# Patient Record
Sex: MALE | Race: BLACK OR AFRICAN AMERICAN | Employment: FULL TIME | URBAN - METROPOLITAN AREA
[De-identification: names, ages, dates, MRNs, and addresses within clinical notes are randomized per-mention and may not be internally consistent; named-entity substitution may affect disease eponyms.]

---

## 2018-01-31 ENCOUNTER — HOSPITAL ENCOUNTER (EMERGENCY)
Age: 53
Discharge: HOME | End: 2018-01-31
Attending: EMERGENCY MEDICINE
Payer: SELF-PAY

## 2018-01-31 VITALS
TEMPERATURE: 100.4 F | SYSTOLIC BLOOD PRESSURE: 144 MMHG | OXYGEN SATURATION: 97 % | RESPIRATION RATE: 18 BRPM | WEIGHT: 220 LBS | HEART RATE: 84 BPM | HEIGHT: 76 IN | DIASTOLIC BLOOD PRESSURE: 88 MMHG | BODY MASS INDEX: 26.79 KG/M2

## 2018-01-31 DIAGNOSIS — J10.1 INFLUENZA B: Primary | ICD-10-CM

## 2018-01-31 LAB
FLUAV AG NPH QL IA: NEGATIVE
FLUBV AG NPH QL IA: POSITIVE

## 2018-01-31 PROCEDURE — 87804 INFLUENZA ASSAY W/OPTIC: CPT | Performed by: EMERGENCY MEDICINE

## 2018-01-31 PROCEDURE — 99283 EMERGENCY DEPT VISIT LOW MDM: CPT | Performed by: NURSE PRACTITIONER

## 2018-01-31 PROCEDURE — 74011250637 HC RX REV CODE- 250/637: Performed by: NURSE PRACTITIONER

## 2018-01-31 RX ORDER — BENZONATATE 100 MG/1
100 CAPSULE ORAL
Qty: 30 CAP | Refills: 0 | Status: SHIPPED | OUTPATIENT
Start: 2018-01-31 | End: 2018-02-07

## 2018-01-31 RX ORDER — HYDROGEN PEROXIDE 3 %
20 SOLUTION, NON-ORAL MISCELLANEOUS DAILY
COMMUNITY

## 2018-01-31 RX ORDER — BENZONATATE 100 MG/1
100 CAPSULE ORAL
Status: COMPLETED | OUTPATIENT
Start: 2018-01-31 | End: 2018-01-31

## 2018-01-31 RX ORDER — ACETAMINOPHEN 325 MG/1
650 TABLET ORAL
Status: COMPLETED | OUTPATIENT
Start: 2018-01-31 | End: 2018-01-31

## 2018-01-31 RX ADMIN — ACETAMINOPHEN 650 MG: 325 TABLET ORAL at 16:38

## 2018-01-31 RX ADMIN — BENZONATATE 100 MG: 100 CAPSULE ORAL at 16:38

## 2018-01-31 NOTE — DISCHARGE INSTRUCTIONS

## 2018-01-31 NOTE — ED NOTES
I have reviewed discharge instructions with the patient. The patient verbalized understanding. Patient left ED via Discharge Method: ambulatory to Home with wife). Opportunity for questions and clarification provided. Patient given 1 scripts. To continue your aftercare when you leave the hospital, you may receive an automated call from our care team to check in on how you are doing. This is a free service and part of our promise to provide the best care and service to meet your aftercare needs.  If you have questions, or wish to unsubscribe from this service please call [phone]. Thank you for Choosing our [facility] Emergency Department.

## 2018-01-31 NOTE — ED PROVIDER NOTES
HPI Comments: Patient presents with cough, fever, and generalized body aches for the past 3 days. Patient is a 47 y.o. male presenting with fever. The history is provided by the patient. Fever    This is a new problem. The current episode started more than 2 days ago. The problem occurs constantly. The problem has not changed since onset. The maximum temperature noted was 102 - 102.9 F. The temperature was taken using an oral thermometer. Associated symptoms include congestion, muscle aches and cough. Pertinent negatives include no vomiting. Past Medical History:   Diagnosis Date    Gastrointestinal disorder        Past Surgical History:   Procedure Laterality Date    HX GI           History reviewed. No pertinent family history. Social History     Social History    Marital status:      Spouse name: N/A    Number of children: N/A    Years of education: N/A     Occupational History    Not on file. Social History Main Topics    Smoking status: Never Smoker    Smokeless tobacco: Never Used    Alcohol use Yes    Drug use: No    Sexual activity: Not on file     Other Topics Concern    Not on file     Social History Narrative         ALLERGIES: Review of patient's allergies indicates no known allergies. Review of Systems   Constitutional: Positive for chills and fever. HENT: Positive for congestion. Respiratory: Positive for cough. Gastrointestinal: Negative for nausea and vomiting. Musculoskeletal: Positive for myalgias. Vitals:    01/31/18 1549   BP: (!) 148/96   Pulse: (!) 106   Resp: 16   Temp: (!) 102.1 °F (38.9 °C)   SpO2: 98%   Weight: 99.8 kg (220 lb)   Height: 6' 4\" (1.93 m)            Physical Exam   Constitutional: He is oriented to person, place, and time. He appears well-developed and well-nourished. No distress. HENT:   Right Ear: Tympanic membrane is erythematous. A middle ear effusion is present. Left Ear: Tympanic membrane is erythematous.  A middle ear effusion is present. Nose: Mucosal edema present. Cardiovascular: Regular rhythm. Tachycardia present. Pulmonary/Chest: Effort normal and breath sounds normal. No respiratory distress. Neurological: He is alert and oriented to person, place, and time. Skin: Skin is warm and dry. He is not diaphoretic. Psychiatric: He has a normal mood and affect. His behavior is normal.   Nursing note and vitals reviewed. Recent Results (from the past 12 hour(s))   INFLUENZA A & B AG (RAPID TEST)    Collection Time: 01/31/18  3:51 PM   Result Value Ref Range    Influenza A Ag NEGATIVE  NEG      Influenza B Ag POSITIVE (A) NEG         MDM  Number of Diagnoses or Management Options  Influenza B:   Diagnosis management comments: Patient with positive flu test. Patient given po tessalon and tylenol prior to discharge. Patient given prescription for tessalon. Patient is outside the time frame for tamiflu.         Amount and/or Complexity of Data Reviewed  Clinical lab tests: ordered and reviewed  Tests in the medicine section of CPT®: ordered    Patient Progress  Patient progress: stable        ED Course       Procedures

## 2021-12-08 ENCOUNTER — HOSPITAL ENCOUNTER (EMERGENCY)
Age: 56
Discharge: HOME | End: 2021-12-09
Attending: EMERGENCY MEDICINE
Payer: OTHER GOVERNMENT

## 2021-12-08 DIAGNOSIS — R19.7 NAUSEA VOMITING AND DIARRHEA: ICD-10-CM

## 2021-12-08 DIAGNOSIS — R11.2 NAUSEA VOMITING AND DIARRHEA: ICD-10-CM

## 2021-12-08 DIAGNOSIS — U07.1 COVID-19: Primary | ICD-10-CM

## 2021-12-08 LAB
ALBUMIN SERPL-MCNC: 3.5 G/DL (ref 3.5–5)
ALBUMIN/GLOB SERPL: 0.7 {RATIO} (ref 1.2–3.5)
ALP SERPL-CCNC: 91 U/L (ref 50–136)
ALT SERPL-CCNC: 67 U/L (ref 12–65)
ANION GAP SERPL CALC-SCNC: 8 MMOL/L (ref 7–16)
AST SERPL-CCNC: 61 U/L (ref 15–37)
BASOPHILS # BLD: 0 K/UL (ref 0–0.2)
BASOPHILS NFR BLD: 0 % (ref 0–2)
BILIRUB SERPL-MCNC: 1.1 MG/DL (ref 0.2–1.1)
BUN SERPL-MCNC: 17 MG/DL (ref 6–23)
CALCIUM SERPL-MCNC: 9.4 MG/DL (ref 8.3–10.4)
CHLORIDE SERPL-SCNC: 101 MMOL/L (ref 98–107)
CO2 SERPL-SCNC: 24 MMOL/L (ref 21–32)
CREAT SERPL-MCNC: 1.59 MG/DL (ref 0.8–1.5)
DIFFERENTIAL METHOD BLD: ABNORMAL
EOSINOPHIL # BLD: 0 K/UL (ref 0–0.8)
EOSINOPHIL NFR BLD: 0 % (ref 0.5–7.8)
ERYTHROCYTE [DISTWIDTH] IN BLOOD BY AUTOMATED COUNT: 13.9 % (ref 11.9–14.6)
GLOBULIN SER CALC-MCNC: 5.2 G/DL (ref 2.3–3.5)
GLUCOSE SERPL-MCNC: 105 MG/DL (ref 65–100)
HCT VFR BLD AUTO: 50.2 % (ref 41.1–50.3)
HGB BLD-MCNC: 16.7 G/DL (ref 13.6–17.2)
IMM GRANULOCYTES # BLD AUTO: 0 K/UL (ref 0–0.5)
IMM GRANULOCYTES NFR BLD AUTO: 0 % (ref 0–5)
LIPASE SERPL-CCNC: 367 U/L (ref 73–393)
LYMPHOCYTES # BLD: 1.3 K/UL (ref 0.5–4.6)
LYMPHOCYTES NFR BLD: 24 % (ref 13–44)
MCH RBC QN AUTO: 29.6 PG (ref 26.1–32.9)
MCHC RBC AUTO-ENTMCNC: 33.3 G/DL (ref 31.4–35)
MCV RBC AUTO: 89 FL (ref 79.6–97.8)
MONOCYTES # BLD: 0.3 K/UL (ref 0.1–1.3)
MONOCYTES NFR BLD: 6 % (ref 4–12)
NEUTS SEG # BLD: 4 K/UL (ref 1.7–8.2)
NEUTS SEG NFR BLD: 70 % (ref 43–78)
NRBC # BLD: 0 K/UL (ref 0–0.2)
PLATELET # BLD AUTO: 216 K/UL (ref 150–450)
PMV BLD AUTO: 10.9 FL (ref 9.4–12.3)
POTASSIUM SERPL-SCNC: 4.3 MMOL/L (ref 3.5–5.1)
PROT SERPL-MCNC: 8.7 G/DL (ref 6.3–8.2)
RBC # BLD AUTO: 5.64 M/UL (ref 4.23–5.6)
SODIUM SERPL-SCNC: 133 MMOL/L (ref 136–145)
WBC # BLD AUTO: 5.6 K/UL (ref 4.3–11.1)

## 2021-12-08 PROCEDURE — 85025 COMPLETE CBC W/AUTO DIFF WBC: CPT

## 2021-12-08 PROCEDURE — 74011250637 HC RX REV CODE- 250/637: Performed by: PHYSICIAN ASSISTANT

## 2021-12-08 PROCEDURE — 96375 TX/PRO/DX INJ NEW DRUG ADDON: CPT

## 2021-12-08 PROCEDURE — 96361 HYDRATE IV INFUSION ADD-ON: CPT

## 2021-12-08 PROCEDURE — 99283 EMERGENCY DEPT VISIT LOW MDM: CPT

## 2021-12-08 PROCEDURE — 80053 COMPREHEN METABOLIC PANEL: CPT

## 2021-12-08 PROCEDURE — 74011000250 HC RX REV CODE- 250: Performed by: PHYSICIAN ASSISTANT

## 2021-12-08 PROCEDURE — 96374 THER/PROPH/DIAG INJ IV PUSH: CPT

## 2021-12-08 PROCEDURE — 83690 ASSAY OF LIPASE: CPT

## 2021-12-08 PROCEDURE — 74011250636 HC RX REV CODE- 250/636: Performed by: PHYSICIAN ASSISTANT

## 2021-12-08 RX ORDER — SODIUM CHLORIDE 0.9 % (FLUSH) 0.9 %
5-10 SYRINGE (ML) INJECTION EVERY 8 HOURS
Status: DISCONTINUED | OUTPATIENT
Start: 2021-12-08 | End: 2021-12-09 | Stop reason: HOSPADM

## 2021-12-08 RX ORDER — SODIUM CHLORIDE 0.9 % (FLUSH) 0.9 %
5-10 SYRINGE (ML) INJECTION AS NEEDED
Status: DISCONTINUED | OUTPATIENT
Start: 2021-12-08 | End: 2021-12-09 | Stop reason: HOSPADM

## 2021-12-08 RX ORDER — HYOSCYAMINE SULFATE 0.12 MG/1
0.12 TABLET SUBLINGUAL
Qty: 12 TABLET | Refills: 0 | Status: SHIPPED | OUTPATIENT
Start: 2021-12-08 | End: 2021-12-11

## 2021-12-08 RX ORDER — HYOSCYAMINE SULFATE 0.12 MG/1
0.12 TABLET SUBLINGUAL
Status: COMPLETED | OUTPATIENT
Start: 2021-12-08 | End: 2021-12-08

## 2021-12-08 RX ORDER — ONDANSETRON 2 MG/ML
4 INJECTION INTRAMUSCULAR; INTRAVENOUS
Status: COMPLETED | OUTPATIENT
Start: 2021-12-08 | End: 2021-12-08

## 2021-12-08 RX ORDER — ONDANSETRON 4 MG/1
4 TABLET, ORALLY DISINTEGRATING ORAL
Qty: 12 TABLET | Refills: 0 | Status: SHIPPED | OUTPATIENT
Start: 2021-12-08

## 2021-12-08 RX ADMIN — HYOSCYAMINE SULFATE 0.12 MG: 0.12 TABLET ORAL; SUBLINGUAL at 22:05

## 2021-12-08 RX ADMIN — SODIUM CHLORIDE 1000 ML: 900 INJECTION, SOLUTION INTRAVENOUS at 22:04

## 2021-12-08 RX ADMIN — ONDANSETRON 4 MG: 2 INJECTION INTRAMUSCULAR; INTRAVENOUS at 22:07

## 2021-12-08 RX ADMIN — Medication 5 ML: at 22:11

## 2021-12-08 RX ADMIN — FAMOTIDINE 20 MG: 10 INJECTION INTRAVENOUS at 22:10

## 2021-12-09 VITALS
HEART RATE: 106 BPM | TEMPERATURE: 99.7 F | WEIGHT: 240 LBS | BODY MASS INDEX: 29.84 KG/M2 | DIASTOLIC BLOOD PRESSURE: 90 MMHG | OXYGEN SATURATION: 96 % | SYSTOLIC BLOOD PRESSURE: 139 MMHG | RESPIRATION RATE: 22 BRPM | HEIGHT: 75 IN

## 2021-12-09 NOTE — ED PROVIDER NOTES
Patient is a 41-year-old male who presents with complaint of nausea vomiting diarrhea and generalized abdominal pain. He has known COVID-19 infection and tested +7 days ago. Symptoms onset 8 days prior. He is not vaccinated. He states he has had persistent nausea vomiting and diarrhea over the last 3 days and has not been able to keep anything down. Additionally he is having generalized abdominal pain states that it just feels \"sore all over\". He denies any fever, chills, chest pain, shortness of breath or difficulty breathing. He does not take any medications daily. The history is provided by the patient. Abdominal Pain  Associated symptoms include abdominal pain. Pertinent negatives include no chest pain and no shortness of breath. Positive For Covid-19  Associated symptoms include abdominal pain. Pertinent negatives include no chest pain and no shortness of breath. Past Medical History:   Diagnosis Date    Gastrointestinal disorder        Past Surgical History:   Procedure Laterality Date    HX GI           No family history on file. Social History     Socioeconomic History    Marital status:      Spouse name: Not on file    Number of children: Not on file    Years of education: Not on file    Highest education level: Not on file   Occupational History    Not on file   Tobacco Use    Smoking status: Never Smoker    Smokeless tobacco: Never Used   Substance and Sexual Activity    Alcohol use: Yes    Drug use: No    Sexual activity: Not on file   Other Topics Concern    Not on file   Social History Narrative    Not on file     Social Determinants of Health     Financial Resource Strain:     Difficulty of Paying Living Expenses: Not on file   Food Insecurity:     Worried About Running Out of Food in the Last Year: Not on file    Eri of Food in the Last Year: Not on file   Transportation Needs:     Lack of Transportation (Medical):  Not on file    Lack of Transportation (Non-Medical): Not on file   Physical Activity:     Days of Exercise per Week: Not on file    Minutes of Exercise per Session: Not on file   Stress:     Feeling of Stress : Not on file   Social Connections:     Frequency of Communication with Friends and Family: Not on file    Frequency of Social Gatherings with Friends and Family: Not on file    Attends [employer] Services: Not on file    Active Member of [address] [place] [address] or Organizations: Not on file    Attends Club or Organization Meetings: Not on file    Marital Status: Not on file   Intimate Partner Violence:     Fear of Current or Ex-Partner: Not on file    Emotionally Abused: Not on file    Physically Abused: Not on file    Sexually Abused: Not on file   Housing Stability:     Unable to Pay for Housing in the Last Year: Not on file    Number of [place] in the Last Year: Not on file    Unstable Housing in the Last Year: Not on file         ALLERGIES: Patient has no known allergies. Review of Systems   Constitutional: Negative for chills and fever. HENT: Negative for sore throat. Respiratory: Negative for cough and shortness of breath. Cardiovascular: Negative for chest pain. Gastrointestinal: Positive for abdominal pain, diarrhea, nausea and vomiting. Musculoskeletal: Negative for back pain. Neurological: Negative for dizziness, weakness and numbness. Psychiatric/Behavioral: Negative for agitation and confusion. Vitals:    12/08/21 1722   BP: (!) 146/98   Pulse: (!) 106   Resp: 22   Temp: 99.7 °F (37.6 °C)   SpO2: 97%   Weight: 108.9 kg (240 lb)   Height: 6' 3\" (1.905 m)            Physical Exam  Vitals and nursing note reviewed. Constitutional:       General: He is not in acute distress. Appearance: He is not ill-appearing or toxic-appearing. HENT:      Head: Normocephalic and atraumatic. Cardiovascular:      Rate and Rhythm: Normal rate and regular rhythm. Heart sounds: Normal heart sounds. Pulmonary:      Effort: Pulmonary effort is normal.      Breath sounds: Normal breath sounds. Abdominal:      General: Abdomen is flat. There is no distension. Palpations: Abdomen is soft. Tenderness: There is generalized abdominal tenderness. There is no guarding or rebound. Skin:     General: Skin is warm and dry. Neurological:      Mental Status: He is alert and oriented to person, place, and time. Psychiatric:         Mood and Affect: Mood normal.         Behavior: Behavior normal.          MDM  Number of Diagnoses or Management Options  COVID-19  Nausea vomiting and diarrhea  Diagnosis management comments: Patient is a 52-year-old male with a known COVID-19 infection on day 8 of symptoms presenting with nausea vomiting diarrhea. He is afebrile, nontoxic in appearance, generalized pain with palpation of abdomen however no rebound or guarding. Labs show mild ANGELICA, will give 1 L fluid bolus, Zofran, Pepcid and Levsin.     Labs Reviewed  CBC WITH AUTOMATED DIFF - Abnormal; Notable for the following components:     RBC                           5.64 (*)               EOSINOPHILS                   0 (*)               All other components within normal limits  METABOLIC PANEL, COMPREHENSIVE - Abnormal; Notable for the following components:     Sodium                        133 (*)                Glucose                       105 (*)                Creatinine                    1.59 (*)               GFR est AA                    58 (*)                 GFR est non-AA                48 (*)                 ALT (SGPT)                    67 (*)                 AST (SGOT)                    61 (*)                 Protein, total                8.7 (*)                Globulin                      5.2 (*)                A-G Ratio                     0.7 (*)             All other components within normal limits  LIPASE    Upon reevaluation patient resting comfortably in bed stating that his abdominal pain has resolved and he is feeling improved. Will DC with prescriptions for Zofran as well as Levsin. Advised patient to rest, increase fluid intake and advance his diet slowly as tolerated. Patient to continue to self isolate for 10 days following symptoms onset. Discussed reasons return to the ER such as worsening fevers, shortness of breath or difficulty breathing. Patient verbalized understanding is agreeable to plan.          Procedures

## 2021-12-09 NOTE — ED NOTES
I have reviewed discharge instructions with the patient. The patient verbalized understanding. Patient left ED via Discharge Method: ambulatory to Home with friend. Opportunity for questions and clarification provided. Patient given 2 scripts. To continue your aftercare when you leave the hospital, you may receive an automated call from our care team to check in on how you are doing. This is a free service and part of our promise to provide the best care and service to meet your aftercare needs.  If you have questions, or wish to unsubscribe from this service please call [phone]. Thank you for Choosing our [facility] Emergency Department.

## 2023-02-12 ENCOUNTER — APPOINTMENT (OUTPATIENT)
Dept: GENERAL RADIOLOGY | Age: 58
End: 2023-02-12

## 2023-02-12 ENCOUNTER — HOSPITAL ENCOUNTER (EMERGENCY)
Age: 58
Discharge: HOME | End: 2023-02-13
Attending: EMERGENCY MEDICINE

## 2023-02-12 VITALS
SYSTOLIC BLOOD PRESSURE: 152 MMHG | OXYGEN SATURATION: 99 % | RESPIRATION RATE: 18 BRPM | BODY MASS INDEX: 29.22 KG/M2 | DIASTOLIC BLOOD PRESSURE: 98 MMHG | HEART RATE: 77 BPM | HEIGHT: 76 IN | TEMPERATURE: 98.1 F | WEIGHT: 240 LBS

## 2023-02-12 DIAGNOSIS — S61.412A LACERATION OF LEFT HAND WITHOUT FOREIGN BODY, INITIAL ENCOUNTER: Primary | ICD-10-CM

## 2023-02-12 PROCEDURE — 90715 TDAP VACCINE 7 YRS/> IM: CPT

## 2023-02-12 PROCEDURE — 6360000002 HC RX W HCPCS

## 2023-02-12 PROCEDURE — 99284 EMERGENCY DEPT VISIT MOD MDM: CPT

## 2023-02-12 PROCEDURE — 90471 IMMUNIZATION ADMIN: CPT

## 2023-02-12 PROCEDURE — 73140 X-RAY EXAM OF FINGER(S): CPT

## 2023-02-12 PROCEDURE — 6370000000 HC RX 637 (ALT 250 FOR IP)

## 2023-02-12 RX ORDER — HYDROCODONE BITARTRATE AND ACETAMINOPHEN 5; 325 MG/1; MG/1
1 TABLET ORAL
Status: COMPLETED | OUTPATIENT
Start: 2023-02-12 | End: 2023-02-12

## 2023-02-12 RX ORDER — LIDOCAINE HYDROCHLORIDE 10 MG/ML
INJECTION, SOLUTION INFILTRATION; PERINEURAL
Status: COMPLETED
Start: 2023-02-12 | End: 2023-02-13

## 2023-02-12 RX ADMIN — TETANUS TOXOID, REDUCED DIPHTHERIA TOXOID AND ACELLULAR PERTUSSIS VACCINE, ADSORBED 0.5 ML: 5; 2.5; 8; 8; 2.5 SUSPENSION INTRAMUSCULAR at 22:07

## 2023-02-12 RX ADMIN — HYDROCODONE BITARTRATE AND ACETAMINOPHEN 1 TABLET: 5; 325 TABLET ORAL at 22:06

## 2023-02-12 ASSESSMENT — PAIN - FUNCTIONAL ASSESSMENT: PAIN_FUNCTIONAL_ASSESSMENT: 0-10

## 2023-02-12 ASSESSMENT — PAIN SCALES - GENERAL: PAINLEVEL_OUTOF10: 6

## 2023-02-12 ASSESSMENT — PAIN DESCRIPTION - LOCATION: LOCATION: FINGER (COMMENT WHICH ONE)

## 2023-02-12 ASSESSMENT — PAIN DESCRIPTION - ORIENTATION: ORIENTATION: LEFT

## 2023-02-12 NOTE — Clinical Note
Jonn Wilder was seen and treated in our emergency department on 2/12/2023. He may return to work on 02/14/2023. If you have any questions or concerns, please don't hesitate to call.       Bryon Eli, KRISTEN - CNP

## 2023-02-13 PROCEDURE — 2500000003 HC RX 250 WO HCPCS

## 2023-02-13 RX ADMIN — LIDOCAINE HYDROCHLORIDE: 10 INJECTION, SOLUTION INFILTRATION; PERINEURAL at 00:25

## 2023-02-13 NOTE — ED TRIAGE NOTES
Pt presents with lac to left tip of thumb, injury occurred about an hour ago, using mandolin blade at home, not UTD on tetanus, bandaged and bleeding controlled in triage.

## 2023-02-13 NOTE — ED PROVIDER NOTES
Emergency Department Provider Note                   PCP:                None None               Age: 65 y.o. Sex: male       ICD-10-CM    1. Laceration of left hand without foreign body, initial encounter  Adriana Cortés MD, [facility], [facility] Micah Kumar To Discharge 02/13/2023 12:10:24 AM        Medical Decision Making  65year old male presents for avulsion to tip left thumb after incident with mandolin. XR shows no associated fracture. Tetanus updated. Attempted bleeding control after digital block, unable to stop bleeding with direct pressure. Marcine Shock applied to the wound. Patient's employment relies directly on use of hands, provided follow up with ortho hand. Problems Addressed:  Laceration of left hand without foreign body, initial encounter: acute illness or injury    Amount and/or Complexity of Data Reviewed  Radiology: ordered. Risk  Prescription drug management. Complexity of Problem: 1 acute, uncomplicated illness or injury. (3)    I have conducted an independent ordering and review of X-rays. Patient was discharged risks and benefits of hospitalization were discussed. ED Course as of 02/13/23 0037   Sun Feb 12, 2023 2215 XR finger:  IMPRESSION:     1. No acute fracture. [CJ]   2305 Digital block performed, unable to control bleeding with direct pressure to the wound. Combat gauze placed to the wound.  [CJ]      ED Course User Index  [CJ] Nehemiah See, APRN - CNP        Orders Placed This Encounter   Procedures    XR FINGER LEFT (MIN 2 VIEWS)    [address] [facility] [address] Mariana Thibodeaux MD, [facility]         Medications   HYDROcodone-acetaminophen [facility]) 5-325 MG per tablet 1 tablet (1 tablet Oral Given 2/12/23 2206)   Tetanus-Diphth-Acell Pertussis (BOOSTRIX) injection 0.5 mL (0.5 mLs IntraMUSCular Given 2/12/23 2207)   lidocaine 1 % injection (  Given 2/13/23 0025)       Discharge Medication List as of 2/13/2023 12:20 AM           Ericka Charles is a 65 y.o. male who presents to the Emergency Department with chief complaint of  No chief complaint on file. 67-year-old male with no medical history presents with open laceration to left thumb. He states that he was attempting to use a mandolin to cut numbers. This occurred approximate 1 hour to arrival in the emergency department. He is unsure of his last tetanus. 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 The history is provided by the patient. Review of Systems   Musculoskeletal:  Negative for joint swelling. Skin:  Positive for wound. All other systems reviewed and are negative. Past Medical History:   Diagnosis Date    Gastrointestinal disorder         Past Surgical History:   Procedure Laterality Date    GI          History reviewed. No pertinent family history. Social History     Socioeconomic History    Marital status:      Spouse name: None    Number of children: None    Years of education: None    Highest education level: None   Tobacco Use    Smoking status: Never    Smokeless tobacco: Never   Substance and Sexual Activity    Alcohol use: Yes     Comment: occasionally    Drug use: No         Patient has no known allergies. Discharge Medication List as of 2/13/2023 12:20 AM        CONTINUE these medications which have NOT CHANGED    Details   esomeprazole (NEXIUM) 20 MG delayed release capsule Take 20 mg by mouth dailyHistorical Med      ondansetron (ZOFRAN-ODT) 4 MG disintegrating tablet Place 4 mg under the tongue every 8 hours as neededHistorical Med              Vitals signs and nursing note reviewed. Patient Vitals for the past 4 hrs:   Temp Pulse Resp BP SpO2   02/12/23 2122 98.1 °F (36.7 °C) 77 18 (!) 152/98 99 %          Physical Exam  Vitals and nursing note reviewed. Constitutional:       General: He is not in acute distress. Appearance: Normal appearance. He is normal weight. He is not ill-appearing, toxic-appearing or diaphoretic. HENT:      Head: Normocephalic and atraumatic. Right Ear: External ear normal.      Left Ear: External ear normal.      Nose: Nose normal.   Eyes:      Extraocular Movements: Extraocular movements intact. Pupils: Pupils are equal, round, and reactive to light. Cardiovascular:      Rate and Rhythm: Normal rate and regular rhythm. Pulses: Normal pulses. Pulmonary:      Effort: Pulmonary effort is normal.   Musculoskeletal:         General: Signs of injury present. No swelling, tenderness or deformity. Normal range of motion. Hands:       Cervical back: Normal range of motion. Comments: Avulsion present to left thumb, small amount of nail avulsed. ROM intact. Active bleeding present to wound. Neurological:      Mental Status: He is alert. Procedures    Results for orders placed or performed during the hospital encounter of 02/12/23   XR FINGER LEFT (MIN 2 VIEWS)    Narrative    EXAMINATION: XR FINGER LEFT (MIN 2 VIEWS)      DATE OF EXAM: 2/12/2023 9:36 PM    HISTORY: Open laceration left thumb     COMPARISON: None. FINDINGS:     No acute fracture or focal destruction. Normal alignment. Joint spaces   maintained. Thumb soft tissue swelling. Impression    1. No acute fracture. Thank you for the referral of this patient. This exam was interpreted by an   American Board of Radiology certified radiologist with subspecialty fellowship   training in body imaging. If there are any questions regarding this exam please   feel free to contact a radiologist directly at [phone]. Slot: 80     Jenifer Romero M.D.   2/12/2023 10:01:00 PM        XR FINGER LEFT (MIN 2 VIEWS)   Final Result      1. No acute fracture. Thank you for the referral of this patient.  This exam was interpreted by an    American Board of Radiology certified radiologist with subspecialty fellowship    training in body imaging. If there are any questions regarding this exam please    feel free to contact a radiologist directly at [phone]. Slot: 80       Ami Umana M.D.    2/12/2023 10:01:00 PM                          Voice dictation software was used during the making of this note. This software is not perfect and grammatical and other typographical errors may be present. This note has not been completely proofread for errors.      KRISTEN Corea - DELIA  02/13/23 0037

## 2023-02-13 NOTE — ED NOTES
I have reviewed discharge instructions with the patient. The patient verbalized understanding. Patient left ED via Discharge Method: ambulatory to Home with wife. Opportunity for questions and clarification provided. Patient given 0 scripts. To continue your aftercare when you leave the hospital, you may receive an automated call from our care team to check in on how you are doing. This is a free service and part of our promise to provide the best care and service to meet your aftercare needs.  If you have questions, or wish to unsubscribe from this service please call [phone]. Thank you for Choosing our [facility] Emergency Department.         Ree Rock RN  02/13/23 4319

## 2023-02-13 NOTE — DISCHARGE INSTRUCTIONS
Please leave dressing in place for 24 hours. Follow-up with hand. Please return for any redness, warmth, drainage from the wound.

## 2023-02-14 ENCOUNTER — HOSPITAL ENCOUNTER (EMERGENCY)
Age: 58
Discharge: HOME | End: 2023-02-14
Attending: EMERGENCY MEDICINE

## 2023-02-14 VITALS
HEIGHT: 76 IN | WEIGHT: 240 LBS | BODY MASS INDEX: 29.22 KG/M2 | HEART RATE: 77 BPM | TEMPERATURE: 98.2 F | RESPIRATION RATE: 16 BRPM | OXYGEN SATURATION: 97 % | DIASTOLIC BLOOD PRESSURE: 109 MMHG | SYSTOLIC BLOOD PRESSURE: 141 MMHG

## 2023-02-14 DIAGNOSIS — Z51.89 VISIT FOR WOUND CHECK: Primary | ICD-10-CM

## 2023-02-14 ASSESSMENT — LIFESTYLE VARIABLES
HOW MANY STANDARD DRINKS CONTAINING ALCOHOL DO YOU HAVE ON A TYPICAL DAY: PATIENT DOES NOT DRINK
HOW OFTEN DO YOU HAVE A DRINK CONTAINING ALCOHOL: NEVER

## 2023-02-14 ASSESSMENT — PAIN SCALES - GENERAL: PAINLEVEL_OUTOF10: 10

## 2023-02-14 ASSESSMENT — PAIN DESCRIPTION - DESCRIPTORS: DESCRIPTORS: ACHING;DISCOMFORT;THROBBING

## 2023-02-14 ASSESSMENT — ENCOUNTER SYMPTOMS: COLOR CHANGE: 0

## 2023-02-14 ASSESSMENT — PAIN DESCRIPTION - LOCATION: LOCATION: FINGER (COMMENT WHICH ONE)

## 2023-02-14 ASSESSMENT — PAIN DESCRIPTION - FREQUENCY: FREQUENCY: CONTINUOUS

## 2023-02-14 ASSESSMENT — PAIN DESCRIPTION - PAIN TYPE: TYPE: ACUTE PAIN

## 2023-02-14 ASSESSMENT — PAIN - FUNCTIONAL ASSESSMENT: PAIN_FUNCTIONAL_ASSESSMENT: 0-10

## 2023-02-14 ASSESSMENT — PAIN DESCRIPTION - ORIENTATION: ORIENTATION: LEFT

## 2023-02-14 NOTE — ED TRIAGE NOTES
Pt cut L thumb w knife Gal night, returns for wound check (+)active bleeding w dressing removal by MD   A&Ox4

## 2023-02-14 NOTE — ED NOTES
I have reviewed discharge instructions with the patient. The patient verbalized understanding. Patient left ED via Discharge Method: ambulatory to Home with family. Opportunity for questions and clarification provided. Patient given 0 scripts. To continue your aftercare when you leave the hospital, you may receive an automated call from our care team to check in on how you are doing. This is a free service and part of our promise to provide the best care and service to meet your aftercare needs.  If you have questions, or wish to unsubscribe from this service please call [phone]. Thank you for Choosing our [facility] Emergency Department.         Alline Fothergill, RN  02/14/23 1654

## 2023-02-14 NOTE — DISCHARGE INSTRUCTIONS
Leave current dressing in place for 2 days  Thursday evening, soak it and gently remove gauze  If still having bleeding issues, reapply the Surgicel gauze and rewrap  If no bleeding, use the yellow Xeroform gauze and rewrap

## 2023-02-15 NOTE — ED PROVIDER NOTES
Emergency Department Provider Note                   PCP:                None None               Age: 65 y.o. Sex: male       ICD-10-CM    1. Visit for wound check  Z51.89           DISPOSITION Decision To Discharge 02/14/2023 02:25:46 PM        Medical Decision Making  64-year-old gentleman here for wound check, wound undressed and then redressed    Risk  OTC drugs. Complexity of Problem: 1 acute, uncomplicated illness or injury. (3)  The patients assessment required an independent historian: I spoke with a family member. Patient was discharged risks and benefits of hospitalization were discussed. No orders of the defined types were placed in this encounter. Medications - No data to display    Discharge Medication List as of 2/14/2023  2:37 PM           Ruslan Nayak is a 65 y.o. male who presents to the Emergency Department with chief complaint of    Chief Complaint   Patient presents with    Wound Check      64-year-old gentleman presents for a wound check he suffered a shave avulsion of the dorsal aspect of his left thumb. He was using a sharp knife to cut some vegetables and took off the dorsal piece of thumbnail wound was cleaned and dressed then but trying to remove the gauze today is too painful        Review of Systems   Constitutional:  Negative for chills and fever. Skin:  Positive for wound. Negative for color change. Neurological:  Negative for weakness and numbness. All other systems reviewed and are negative. Past Medical History:   Diagnosis Date    Gastrointestinal disorder         Past Surgical History:   Procedure Laterality Date    GI          History reviewed. No pertinent family history.      Social History     Socioeconomic History    Marital status:      Spouse name: None    Number of children: None    Years of education: None    Highest education level: None   Tobacco Use    Smoking status: Never    Smokeless tobacco: Never Substance and Sexual Activity    Alcohol use: Yes     Comment: occasionally    Drug use: No         Patient has no known allergies. Discharge Medication List as of 2/14/2023  2:37 PM        CONTINUE these medications which have NOT CHANGED    Details   esomeprazole (NEXIUM) 20 MG delayed release capsule Take 20 mg by mouth dailyHistorical Med      ondansetron (ZOFRAN-ODT) 4 MG disintegrating tablet Place 4 mg under the tongue every 8 hours as neededHistorical Med              Vitals signs and nursing note reviewed. No data found. Physical Exam  Vitals and nursing note reviewed. Constitutional:       General: He is not in acute distress. Appearance: Normal appearance. He is not ill-appearing. HENT:      Head: Normocephalic and atraumatic. Right Ear: External ear normal.      Left Ear: External ear normal.      Nose: Nose normal. No rhinorrhea. Eyes:      General: No scleral icterus. Right eye: No discharge. Left eye: No discharge. Extraocular Movements: Extraocular movements intact. Pulmonary:      Effort: Pulmonary effort is normal. No respiratory distress. Musculoskeletal:         General: Tenderness present. No signs of injury. Normal range of motion. Hands:       Cervical back: Normal range of motion and neck supple. Skin:     General: Skin is warm and dry. Coloration: Skin is not jaundiced or pale. Neurological:      General: No focal deficit present. Mental Status: He is alert and oriented to person, place, and time. Mental status is at baseline.       Gait: Gait normal.   Psychiatric:         Mood and Affect: Mood normal.         Behavior: Behavior normal.        Wound care    Date/Time: 2/14/2023 2:31 PM  Performed by: José Miguel Stuart MD  Authorized by: José Miguel Stuart MD     Consent:     Consent obtained:  Verbal    Consent given by:  Patient    Risks discussed:  Bleeding and pain  Sedation:     Sedation type:  None  Anesthesia: Anesthesia method:  None  Procedure details:     Indications: open wounds      Wound location:  Finger    Finger location:  L thumb    Wound age (days):  2    Wound surface area (sq cm):  0.8    Debridement performed: No    Comments:      Bleeding started to send his old dressing was removed, 6 L of Surgicel hemostat was applied wrapped in place with some clean gauze and then Coban    I sent home    No results found for any visits on 02/14/23. No orders to display                       Voice dictation software was used during the making of this note. This software is not perfect and grammatical and other typographical errors may be present. This note has not been completely proofread for errors.         Anya Yusuf MD  02/14/23 [id number]

## 2024-04-01 NOTE — ED TRIAGE NOTES
Pt arrives via pov c/o abd discomfort and unable to eat. States nausea/vomiting and diarrhea. Pt reports positive for covid since last wednesday. The patient is a 28y Male complaining of psychiatric evaluation.